# Patient Record
Sex: MALE | URBAN - METROPOLITAN AREA
[De-identification: names, ages, dates, MRNs, and addresses within clinical notes are randomized per-mention and may not be internally consistent; named-entity substitution may affect disease eponyms.]

---

## 2021-02-03 ENCOUNTER — TELEPHONE ENCOUNTER (OUTPATIENT)
Dept: URBAN - METROPOLITAN AREA CLINIC 113 | Facility: CLINIC | Age: 51
End: 2021-02-03

## 2021-02-03 VITALS — BODY MASS INDEX: 27.92 KG/M2 | WEIGHT: 195 LBS | HEIGHT: 70 IN

## 2021-02-03 RX ORDER — TAMSULOSIN HYDROCHLORIDE 0.4 MG/1
1 CAPSULE CAPSULE ORAL ONCE A DAY
Status: ACTIVE | COMMUNITY

## 2021-02-03 RX ORDER — SODIUM, POTASSIUM,MAG SULFATES 17.5-3.13G
354 ML SOLUTION, RECONSTITUTED, ORAL ORAL
Qty: 354 ML | Refills: 0 | OUTPATIENT
Start: 2021-02-03 | End: 2021-02-04

## 2021-02-04 ENCOUNTER — LAB OUTSIDE AN ENCOUNTER (OUTPATIENT)
Dept: URBAN - METROPOLITAN AREA CLINIC 113 | Facility: CLINIC | Age: 51
End: 2021-02-04

## 2021-02-04 ENCOUNTER — ERX REFILL RESPONSE (OUTPATIENT)
Dept: URBAN - METROPOLITAN AREA CLINIC 113 | Facility: CLINIC | Age: 51
End: 2021-02-04

## 2021-02-04 RX ORDER — SODIUM PICOSULFATE, MAGNESIUM OXIDE, AND ANHYDROUS CITRIC ACID 10; 3.5; 12 MG/160ML; G/160ML; G/160ML
PLEASE SPECIFY DIRECTIONS, REFILLS AND QUANTITY LIQUID ORAL
Qty: 1 MILLILITER | Refills: 0 | OUTPATIENT

## 2021-02-04 RX ORDER — SODIUM SULFATE, POTASSIUM SULFATE, MAGNESIUM SULFATE 17.5; 3.13; 1.6 G/ML; G/ML; G/ML
354 ML SOLUTION, CONCENTRATE ORAL
Qty: 354 | Refills: 0 | OUTPATIENT

## 2021-02-10 ENCOUNTER — OFFICE VISIT (OUTPATIENT)
Dept: URBAN - METROPOLITAN AREA SURGERY CENTER 25 | Facility: SURGERY CENTER | Age: 51
End: 2021-02-10
Payer: COMMERCIAL

## 2021-02-10 ENCOUNTER — CLAIMS CREATED FROM THE CLAIM WINDOW (OUTPATIENT)
Dept: URBAN - METROPOLITAN AREA CLINIC 4 | Facility: CLINIC | Age: 51
End: 2021-02-10
Payer: COMMERCIAL

## 2021-02-10 DIAGNOSIS — Z12.11 COLON CANCER SCREENING: ICD-10-CM

## 2021-02-10 DIAGNOSIS — K52.3 INDETERMINATE COLITIS: ICD-10-CM

## 2021-02-10 DIAGNOSIS — K52.3 COLITIS, INDETERMINATE: ICD-10-CM

## 2021-02-10 PROCEDURE — 88305 TISSUE EXAM BY PATHOLOGIST: CPT | Performed by: PATHOLOGY

## 2021-02-10 PROCEDURE — G8907 PT DOC NO EVENTS ON DISCHARG: HCPCS | Performed by: INTERNAL MEDICINE

## 2021-02-10 PROCEDURE — 45380 COLONOSCOPY AND BIOPSY: CPT | Performed by: INTERNAL MEDICINE

## 2021-02-10 RX ORDER — TAMSULOSIN HYDROCHLORIDE 0.4 MG/1
1 CAPSULE CAPSULE ORAL ONCE A DAY
Status: ACTIVE | COMMUNITY

## 2021-02-10 RX ORDER — SODIUM PICOSULFATE, MAGNESIUM OXIDE, AND ANHYDROUS CITRIC ACID 10; 3.5; 12 MG/160ML; G/160ML; G/160ML
PLEASE SPECIFY DIRECTIONS, REFILLS AND QUANTITY LIQUID ORAL
Qty: 1 MILLILITER | Refills: 0 | Status: ACTIVE | COMMUNITY

## 2021-03-05 ENCOUNTER — WEB ENCOUNTER (OUTPATIENT)
Dept: URBAN - METROPOLITAN AREA CLINIC 113 | Facility: CLINIC | Age: 51
End: 2021-03-05

## 2021-03-05 ENCOUNTER — OFFICE VISIT (OUTPATIENT)
Dept: URBAN - METROPOLITAN AREA CLINIC 113 | Facility: CLINIC | Age: 51
End: 2021-03-05
Payer: COMMERCIAL

## 2021-03-05 VITALS
WEIGHT: 205 LBS | SYSTOLIC BLOOD PRESSURE: 116 MMHG | HEART RATE: 62 BPM | DIASTOLIC BLOOD PRESSURE: 78 MMHG | HEIGHT: 70 IN | BODY MASS INDEX: 29.35 KG/M2 | TEMPERATURE: 97.8 F

## 2021-03-05 DIAGNOSIS — K52.3 INDETERMINATE COLITIS: ICD-10-CM

## 2021-03-05 PROBLEM — 235746007: Status: ACTIVE | Noted: 2021-03-05

## 2021-03-05 PROCEDURE — 99213 OFFICE O/P EST LOW 20 MIN: CPT | Performed by: NURSE PRACTITIONER

## 2021-03-05 RX ORDER — TAMSULOSIN HYDROCHLORIDE 0.4 MG/1
1 CAPSULE CAPSULE ORAL ONCE A DAY
Status: ACTIVE | COMMUNITY

## 2021-03-05 RX ORDER — SODIUM PICOSULFATE, MAGNESIUM OXIDE, AND ANHYDROUS CITRIC ACID 10; 3.5; 12 MG/160ML; G/160ML; G/160ML
PLEASE SPECIFY DIRECTIONS, REFILLS AND QUANTITY LIQUID ORAL
Qty: 1 MILLILITER | Refills: 0 | Status: ON HOLD | COMMUNITY

## 2021-03-05 NOTE — HPI-TODAY'S VISIT:
50-year-old male presenting for follow-up after screening colonoscopy.  Colonoscopy was performed 2/10/2020.  Diverticulosis was noted in the sigmoid colon.  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  No evidence of infection, dysplasia or malignancy.  He reports a history of colonoscopy in his 20s for further evaluation of IBS type complaints, which he states was normal. He has bowel movements once daily. Stools are soft using daily fiber supplement. No blood per rectum. No abdominal pain. Appetite is fair, weight is stable. No fever or chills. No heartburn or dysphagia.

## 2024-02-21 ENCOUNTER — LAB (OUTPATIENT)
Dept: URBAN - METROPOLITAN AREA CLINIC 113 | Facility: CLINIC | Age: 54
End: 2024-02-21

## 2024-02-21 ENCOUNTER — OV NP (OUTPATIENT)
Dept: URBAN - METROPOLITAN AREA CLINIC 113 | Facility: CLINIC | Age: 54
End: 2024-02-21
Payer: COMMERCIAL

## 2024-02-21 VITALS
HEIGHT: 70 IN | SYSTOLIC BLOOD PRESSURE: 144 MMHG | RESPIRATION RATE: 20 BRPM | HEART RATE: 70 BPM | WEIGHT: 192.3 LBS | TEMPERATURE: 97.3 F | DIASTOLIC BLOOD PRESSURE: 81 MMHG | BODY MASS INDEX: 27.53 KG/M2

## 2024-02-21 DIAGNOSIS — R19.4 CHANGE IN BOWEL HABITS: ICD-10-CM

## 2024-02-21 DIAGNOSIS — K52.3 INDETERMINATE COLITIS: ICD-10-CM

## 2024-02-21 DIAGNOSIS — K92.1 HEMATOCHEZIA: ICD-10-CM

## 2024-02-21 PROCEDURE — 99214 OFFICE O/P EST MOD 30 MIN: CPT | Performed by: NURSE PRACTITIONER

## 2024-02-21 RX ORDER — SODIUM PICOSULFATE, MAGNESIUM OXIDE, AND ANHYDROUS CITRIC ACID 10; 3.5; 12 MG/160ML; G/160ML; G/160ML
PLEASE SPECIFY DIRECTIONS, REFILLS AND QUANTITY LIQUID ORAL
Qty: 1 MILLILITER | Refills: 0 | Status: ON HOLD | COMMUNITY

## 2024-02-21 RX ORDER — TAMSULOSIN HYDROCHLORIDE 0.4 MG/1
1 CAPSULE CAPSULE ORAL ONCE A DAY
Status: ACTIVE | COMMUNITY

## 2024-02-21 NOTE — PHYSICAL EXAM SKIN:
no rashes , no jaundice present Ketoconazole Pregnancy And Lactation Text: This medication is Pregnancy Category C and it isn't know if it is safe during pregnancy. It is also excreted in breast milk and breast feeding isn't recommended.

## 2024-02-21 NOTE — HPI-TODAY'S VISIT:
52 yo male with a history of diverticulosis, chronic active colitis of indeterminate type in the right colon noted on initial colonoscopy in 2021, presenting for evaluation of a change in bowel habit and blood per rectum.  He has been experiencing a change in bowel habits with diarrhea, alternating with firm stool, associated with blood and mucus per rectum. He saw his PCP, who referred him back to our office. Symptoms began around January 1, lasting about 6 weeks. He continues with gas, bloating and noisy stomach. He attributed his symptoms to poor dietary choices around the holiday. He tried to clean up his diet and increase his exercise, without any improvement. He had blood work with Dr. Mandel, to include CBC, which was normal.

## 2024-03-26 ENCOUNTER — LAB (OUTPATIENT)
Dept: URBAN - METROPOLITAN AREA CLINIC 4 | Facility: CLINIC | Age: 54
End: 2024-03-26
Payer: COMMERCIAL

## 2024-03-26 ENCOUNTER — COLON (OUTPATIENT)
Dept: URBAN - METROPOLITAN AREA SURGERY CENTER 25 | Facility: SURGERY CENTER | Age: 54
End: 2024-03-26

## 2024-03-26 DIAGNOSIS — K63.89 OTHER SPECIFIED DISEASES OF INTESTINE: ICD-10-CM

## 2024-03-26 PROCEDURE — 88305 TISSUE EXAM BY PATHOLOGIST: CPT | Performed by: PATHOLOGY

## 2024-03-26 RX ORDER — TAMSULOSIN HYDROCHLORIDE 0.4 MG/1
1 CAPSULE CAPSULE ORAL ONCE A DAY
Status: ACTIVE | COMMUNITY

## 2024-03-26 RX ORDER — SODIUM PICOSULFATE, MAGNESIUM OXIDE, AND ANHYDROUS CITRIC ACID 10; 3.5; 12 MG/160ML; G/160ML; G/160ML
PLEASE SPECIFY DIRECTIONS, REFILLS AND QUANTITY LIQUID ORAL
Qty: 1 MILLILITER | Refills: 0 | Status: ON HOLD | COMMUNITY

## 2024-04-16 ENCOUNTER — OV EP (OUTPATIENT)
Dept: URBAN - METROPOLITAN AREA CLINIC 113 | Facility: CLINIC | Age: 54
End: 2024-04-16

## 2024-05-23 ENCOUNTER — DASHBOARD ENCOUNTERS (OUTPATIENT)
Age: 54
End: 2024-05-23

## 2024-05-23 ENCOUNTER — OFFICE VISIT (OUTPATIENT)
Dept: URBAN - METROPOLITAN AREA CLINIC 113 | Facility: CLINIC | Age: 54
End: 2024-05-23
Payer: COMMERCIAL

## 2024-05-23 VITALS
HEART RATE: 56 BPM | HEIGHT: 70 IN | RESPIRATION RATE: 20 BRPM | SYSTOLIC BLOOD PRESSURE: 128 MMHG | WEIGHT: 199.2 LBS | BODY MASS INDEX: 28.52 KG/M2 | TEMPERATURE: 97.7 F | DIASTOLIC BLOOD PRESSURE: 75 MMHG

## 2024-05-23 DIAGNOSIS — K52.3 INDETERMINATE COLITIS: ICD-10-CM

## 2024-05-23 PROCEDURE — 99213 OFFICE O/P EST LOW 20 MIN: CPT | Performed by: NURSE PRACTITIONER

## 2024-05-23 RX ORDER — TAMSULOSIN HYDROCHLORIDE 0.4 MG/1
1 CAPSULE CAPSULE ORAL ONCE A DAY
Status: ACTIVE | COMMUNITY

## 2024-05-23 RX ORDER — SODIUM PICOSULFATE, MAGNESIUM OXIDE, AND ANHYDROUS CITRIC ACID 10; 3.5; 12 MG/160ML; G/160ML; G/160ML
PLEASE SPECIFY DIRECTIONS, REFILLS AND QUANTITY LIQUID ORAL
Qty: 1 MILLILITER | Refills: 0 | Status: ON HOLD | COMMUNITY

## 2024-10-29 ENCOUNTER — OFFICE VISIT (OUTPATIENT)
Dept: URBAN - METROPOLITAN AREA CLINIC 113 | Facility: CLINIC | Age: 54
End: 2024-10-29
Payer: COMMERCIAL

## 2024-10-29 VITALS
RESPIRATION RATE: 12 BRPM | BODY MASS INDEX: 27.6 KG/M2 | HEIGHT: 70 IN | TEMPERATURE: 97 F | SYSTOLIC BLOOD PRESSURE: 124 MMHG | DIASTOLIC BLOOD PRESSURE: 74 MMHG | HEART RATE: 55 BPM | WEIGHT: 192.8 LBS

## 2024-10-29 DIAGNOSIS — K92.1 HEMATOCHEZIA: ICD-10-CM

## 2024-10-29 DIAGNOSIS — K52.9 CHRONIC DIARRHEA: ICD-10-CM

## 2024-10-29 DIAGNOSIS — K52.3 INDETERMINATE COLITIS: ICD-10-CM

## 2024-10-29 DIAGNOSIS — R15.2 FECAL URGENCY: ICD-10-CM

## 2024-10-29 PROCEDURE — 99214 OFFICE O/P EST MOD 30 MIN: CPT | Performed by: NURSE PRACTITIONER

## 2024-10-29 RX ORDER — TAMSULOSIN HYDROCHLORIDE 0.4 MG/1
1 CAPSULE CAPSULE ORAL ONCE A DAY
Status: ACTIVE | COMMUNITY

## 2024-10-29 RX ORDER — MESALAMINE 1000 MG/1
1 SUPPOSITORY AT BEDTIME SUPPOSITORY RECTAL ONCE A DAY
Qty: 30 | OUTPATIENT
Start: 2024-10-29 | End: 2024-11-27

## 2024-10-29 RX ORDER — SODIUM PICOSULFATE, MAGNESIUM OXIDE, AND ANHYDROUS CITRIC ACID 10; 3.5; 12 MG/160ML; G/160ML; G/160ML
PLEASE SPECIFY DIRECTIONS, REFILLS AND QUANTITY LIQUID ORAL
Qty: 1 MILLILITER | Refills: 0 | Status: ON HOLD | COMMUNITY

## 2024-10-29 NOTE — HPI-TODAY'S VISIT:
54-year-old gentleman with history of indeterminate colitis noted on colonoscopy in 2021, presenting for evaluation of blood per rectum. He was seen in the office 5/23/2024 following a colonoscopy performed for recurrence of hematochezia and a change in bowel habit.  Colonoscopy was notable for a good bowel preparation, normal rectal examination, diverticulosis, normal mucosa in the right and left colon status post biopsies.  There is erythematous mucosa in the rectum status post biopsies showing patchy chronic active colitis, indeterminate type.  A trial of Canasa suppositories or Rowasa enemas was a consideration should symptoms recur.  At the end of August, he started having recurrent diarrhea and hematochezia. He was treated with Augmentin per Yanna, and was recommended a liquid diet. He did not have any improvement. He continues with urgent post prandial diarrhea, increased gas, and blood per rectum. He resume fiber about 4 weeks ago with incomplete improvement. He describes milady as well.

## 2024-10-30 LAB
HEMATOCRIT: 46.3
HEMOGLOBIN: 15.2
MCH: 29.6
MCHC: 32.8
MCV: 90.3
MPV: 10.9
PLATELET COUNT: 279
RDW: 13
RED BLOOD CELL COUNT: 5.13
WHITE BLOOD CELL COUNT: 6.4

## 2024-12-02 ENCOUNTER — ERX REFILL RESPONSE (OUTPATIENT)
Dept: URBAN - METROPOLITAN AREA CLINIC 113 | Facility: CLINIC | Age: 54
End: 2024-12-02

## 2024-12-02 RX ORDER — MESALAMINE 1000 MG/1
INSERT 1 SUPPOSITORY RECTALLY EVERY DAY AT BEDTIME SUPPOSITORY RECTAL
Qty: 30 SUPPOSITORY | Refills: 0 | OUTPATIENT

## 2024-12-02 RX ORDER — MESALAMINE 1000 MG/1
INSERT 1 SUPPOSITORY RECTALLY EVERY DAY AT BEDTIME SUPPOSITORY RECTAL
Qty: 30 SUPPOSITORY | Refills: 6 | OUTPATIENT

## 2025-01-02 ENCOUNTER — OFFICE VISIT (OUTPATIENT)
Dept: URBAN - METROPOLITAN AREA CLINIC 113 | Facility: CLINIC | Age: 55
End: 2025-01-02
Payer: COMMERCIAL

## 2025-01-02 VITALS
RESPIRATION RATE: 18 BRPM | DIASTOLIC BLOOD PRESSURE: 87 MMHG | SYSTOLIC BLOOD PRESSURE: 151 MMHG | HEART RATE: 69 BPM | TEMPERATURE: 97.7 F | WEIGHT: 211 LBS | BODY MASS INDEX: 30.21 KG/M2 | HEIGHT: 70 IN

## 2025-01-02 DIAGNOSIS — K52.3 INDETERMINATE COLITIS: ICD-10-CM

## 2025-01-02 DIAGNOSIS — R15.2 FECAL URGENCY: ICD-10-CM

## 2025-01-02 DIAGNOSIS — K92.1 HEMATOCHEZIA: ICD-10-CM

## 2025-01-02 PROCEDURE — 99213 OFFICE O/P EST LOW 20 MIN: CPT | Performed by: NURSE PRACTITIONER

## 2025-01-02 RX ORDER — SODIUM PICOSULFATE, MAGNESIUM OXIDE, AND ANHYDROUS CITRIC ACID 10; 3.5; 12 MG/160ML; G/160ML; G/160ML
PLEASE SPECIFY DIRECTIONS, REFILLS AND QUANTITY LIQUID ORAL
Qty: 1 MILLILITER | Refills: 0 | Status: ON HOLD | COMMUNITY

## 2025-01-02 RX ORDER — MESALAMINE 1000 MG/1
INSERT 1 SUPPOSITORY RECTALLY EVERY DAY AT BEDTIME SUPPOSITORY RECTAL
Qty: 30 SUPPOSITORY | Refills: 6 | Status: ON HOLD | COMMUNITY

## 2025-01-02 RX ORDER — TAMSULOSIN HYDROCHLORIDE 0.4 MG/1
1 CAPSULE CAPSULE ORAL ONCE A DAY
Status: ACTIVE | COMMUNITY

## 2025-01-02 NOTE — HPI-TODAY'S VISIT:
55 yo male with indeterminate colitis, presenting for follow up after a trial of Canasa suppositories nightly for  30 days for treatment of hematochezia and fecal urgency.  He tells me that he took the mesalamine suppositories for 30 days with good improvement in his symptoms. His bowel movements are occurring once to twice daily. He is not having any urgency or blood per rectum. He is having some bloating and weight gain attributed to the holiday season and decreased excercise. There is no heartburn, or dysphagia. He is overall doing well.

## 2025-06-27 ENCOUNTER — ERX REFILL RESPONSE (OUTPATIENT)
Dept: URBAN - METROPOLITAN AREA CLINIC 113 | Facility: CLINIC | Age: 55
End: 2025-06-27

## 2025-06-27 RX ORDER — MESALAMINE 1000 MG/1
INSERT 1 SUPPOSITORY RECTALLY EVERY DAY AT BEDTIME 30 SUPPOSITORY RECTAL
Qty: 90 SUPPOSITORIES | Refills: 2

## 2025-07-02 ENCOUNTER — OFFICE VISIT (OUTPATIENT)
Dept: URBAN - METROPOLITAN AREA CLINIC 113 | Facility: CLINIC | Age: 55
End: 2025-07-02
Payer: COMMERCIAL

## 2025-07-02 DIAGNOSIS — R15.2 FECAL URGENCY: ICD-10-CM

## 2025-07-02 DIAGNOSIS — K58.0 IRRITABLE BOWEL SYNDROME WITH DIARRHEA: ICD-10-CM

## 2025-07-02 DIAGNOSIS — R19.7 ACUTE DIARRHEA: ICD-10-CM

## 2025-07-02 DIAGNOSIS — K52.3 INDETERMINATE COLITIS: ICD-10-CM

## 2025-07-02 PROBLEM — 197125005: Status: ACTIVE | Noted: 2025-07-02

## 2025-07-02 PROCEDURE — 99214 OFFICE O/P EST MOD 30 MIN: CPT | Performed by: NURSE PRACTITIONER

## 2025-07-02 RX ORDER — TAMSULOSIN HYDROCHLORIDE 0.4 MG/1
1 CAPSULE CAPSULE ORAL ONCE A DAY
Status: ACTIVE | COMMUNITY

## 2025-07-02 RX ORDER — MESALAMINE 1000 MG/1
INSERT 1 SUPPOSITORY RECTALLY EVERY DAY AT BEDTIME 30 SUPPOSITORY RECTAL
Qty: 90 SUPPOSITORIES | Refills: 2 | Status: ACTIVE | COMMUNITY

## 2025-07-02 RX ORDER — MESALAMINE 1000 MG/1
_INSERT 1 SUPPOSITORY RECTALLY EVERY DAY AT BEDTIME 30 SUPPOSITORY RECTAL
OUTPATIENT

## 2025-07-02 RX ORDER — RIFAXIMIN 550 MG/1
1 TABLET TABLET ORAL THREE TIMES A DAY
Qty: 42 TABLET | Refills: 0 | OUTPATIENT
Start: 2025-07-02 | End: 2025-07-16

## 2025-07-02 RX ORDER — SODIUM PICOSULFATE, MAGNESIUM OXIDE, AND ANHYDROUS CITRIC ACID 10; 3.5; 12 MG/160ML; G/160ML; G/160ML
PLEASE SPECIFY DIRECTIONS, REFILLS AND QUANTITY LIQUID ORAL
Qty: 1 MILLILITER | Refills: 0 | Status: ON HOLD | COMMUNITY

## 2025-07-02 NOTE — HPI-TODAY'S VISIT:
55-year-old gentleman with a history of indeterminate colitis and hematochezia, presenting for 6-month follow-up.  He was initially treated with a 30-day trial of Canasa suppositories with improvement of his symptoms.  He is doing well overall. He has to use Canasa suppositories  for a couple of weeks at a time, estimated to be needed every couple of weeks. He is not having any blood per rectum. Symptom wise, he experiences increased frequency of loose stools with urgency. No mucus per rectum. He has trouble with a sensation of need for bowel movements, then is unable to go.  No nocturnal defecation. There is no abdominal pain, nausea or vomiting, weight loss or changes in appetite. No heartburn or trouble with swallowing. He can have some bloating and rumbling in his stomach at times.

## 2025-07-03 ENCOUNTER — TELEPHONE ENCOUNTER (OUTPATIENT)
Dept: URBAN - METROPOLITAN AREA CLINIC 113 | Facility: CLINIC | Age: 55
End: 2025-07-03